# Patient Record
Sex: MALE | Race: WHITE | NOT HISPANIC OR LATINO | URBAN - METROPOLITAN AREA
[De-identification: names, ages, dates, MRNs, and addresses within clinical notes are randomized per-mention and may not be internally consistent; named-entity substitution may affect disease eponyms.]

---

## 2018-01-14 NOTE — RESULT NOTES
Message   PLEASE CALL [facility]   CHOL   - GOOD NUMBER   KIDNEY FUNCTION, LIVER FUNCTION, THYROID CHECK WERE ALL OK   VIT D WAS LOW   RECOMMEND OVER THE COUNTER VIT D3 1000 IU DAILY   THANKS     Verified Results  (1) COMPREHENSIVE METABOLIC PANEL [id number] 72:16RE [facility]     Test Name Result Flag Reference   GLUCOSE 109 mg/dL H 65-99   Fasting reference interval   UREA NITROGEN (BUN) 23 mg/dL  7-25   CREATININE 1 13 mg/dL  0 60-1 35   eGFR NON-AFR  AMERICAN 81 mL/min/1 73m2  > OR = 60   eGFR AFRICAN AMERICAN 94 mL/min/1 73m2  > OR = 60   BUN/CREATININE RATIO   4-16   NOT APPLICABLE (calc)   SODIUM 140 mmol/L  135-146   POTASSIUM 3 8 mmol/L  3 5-5 3   CHLORIDE 104 mmol/L     CARBON DIOXIDE 23 mmol/L  19-30   CALCIUM 9 5 mg/dL  8 6-10 3   PROTEIN, TOTAL 6 9 g/dL  6 1-8 1   ALBUMIN 4 4 g/dL  3 6-5 1   GLOBULIN 2 5 g/dL (calc)  1 9-3 7   ALBUMIN/GLOBULIN RATIO 1 8 (calc)  1 0-2 5   BILIRUBIN, TOTAL 0 7 mg/dL  0 2-1 2   ALKALINE PHOSPHATASE 78 U/L     AST 20 U/L  10-40   ALT 38 U/L  9-46     (1) LIPID PANEL, FASTING 28Apr2016 12:00AM [facility]     Test Name Result Flag Reference   CHOLESTEROL, TOTAL 172 mg/dL  125-200   HDL CHOLESTEROL 46 mg/dL  > OR = 40   TRIGLICERIDES 455 mg/dL  <150   LDL-CHOLESTEROL 103 mg/dL (calc)  <130   Desirable range <100 mg/dL for patients with CHD or  diabetes and <70 mg/dL for diabetic patients with  known heart disease  CHOL/HDLC RATIO 3 7 (calc)  < OR = 5 0   NON HDL CHOLESTEROL 126 mg/dL (calc)     Target for non-HDL cholesterol is 30 mg/dL higher than   LDL cholesterol target       (Q) CBC (H/H, RBC, INDICES, WBC, PLT) 28Apr2016 12:00AM [facility]     Test Name Result Flag Reference   WHITE BLOOD CELL COUNT 9 5 Thousand/uL  3 8-10 8   RED BLOOD CELL COUNT 5 48 Million/uL  4 20-5 80   HEMOGLOBIN 15 5 g/dL  13 2-17 1   HEMATOCRIT 47 7 %  38 5-50 0   MCV 87 1 fL  80 0-100 0   MCH 28 2 pg  27 0-33 0   MCHC 32 4 g/dL  32 0-36 0   RDW 13 5 % 11 0-15 0   PLATELET COUNT 953 Thousand/uL  140-400   MPV 8 5 fL  7 5-11 5   WE RECEIVED YOUR HANDWRITTEN TEST ORDER AND  PERFORMED A HEMOGRAM WITH A PLATELET WITHOUT  A DIFFERENTIAL  IF THIS IS NOT WHAT YOU INTENDED  TO ORDER, PLEASE CONTACT YOUR LOCAL CLIENT SERVICE  REPRESENTATIVE IMMEDIATELY SO THAT WE CAN   ADJUST OUR BILLING APPROPRIATELY  YOU MAY ALSO   INQUIRE ABOUT ALTERNATIVE OR ADDITIONAL TESTING            (Q) TSH, 3RD GENERATION 28Apr2016 12:00AM [facility]     Test Name Result Flag Reference   TSH 1 20 mIU/L  0 40-4 50     *(Q) VITAMIN D, 25-HYDROXY, LC/MS/MS 28Apr2016 12:00AM [facility]     Test Name Result Flag Reference   VITAMIN D, 25-OH, TOTAL 11 ng/mL L    Vitamin D Status         25-OH Vitamin D:     Deficiency:                    <20 ng/mL  Insufficiency:             20 - 29 ng/mL  Optimal:                 > or = 30 ng/mL     For 25-OH Vitamin D testing on patients on   D2-supplementation and patients for whom quantitation   of D2 and D3 fractions is required, the QuestAssureD(TM)  25-OH VIT D, (D2,D3), LC/MS/MS is recommended: order   code 02930 (patients >2yrs)  For more information on this test, go to:  http://education  [employer]/faq/XTL861  (This link is being provided for   informational/educational purposes only )

## 2018-01-16 NOTE — PROGRESS NOTES
Assessment    1  Encounter for preventive health examination (V70 0) (Z00 00)   2  Allergic rhinitis due to pollen (477 0) (J30 1)   3  Midline low back pain without sciatica, unspecified chronicity (724 2) (M54 5)   4  Need for Tdap vaccination (V06 1) (Z23)    Plan  Health Maintenance    · (1) CBC/ PLT (NO DIFF); Status:Active; Requested for:28Apr2016;    · (1) COMPREHENSIVE METABOLIC PANEL; Status:Active; Requested for:28Apr2016;    · (1) LIPID PANEL, FASTING; Status:Active; Requested for:28Apr2016;    · (1) TSH; Status:Active; Requested for:28Apr2016;    · (1) VITAMIN D 25-HYDROXY; Status:Active; Requested for:28Apr2016;    · EKG/ECG- POC; Status:Active; Requested for:28Apr2016;    · Hemoccult Screening - POC; Status:Active; Requested for:28Apr2016;    · Routine Venipuncture - POC; Status:Complete;   Done: [id number]   · Urine Dip Automated- POC; Status:Active; Requested for:28Apr2016;    · Follow-up visit in 1 year Evaluation and Treatment  Follow-up  Status: Hold For -  Scheduling  Requested for: 28Apr2016   · Drink plenty of fluids ; Status:Complete;   Done: 28Apr2016   · Eat a low fat and low cholesterol diet ; Status:Complete;   Done: [id number]   · Use a sun block product with an SPF of 15 or more ; Status:Complete;   Done:  [id number]   · We encourage all of our patients to exercise regularly  30 minutes of exercise or physical  activity five or more days a week is recommended for children and adults ;  Status:Complete;   Done: 28Apr2016   · We recommend routine visits to a dentist ; Status:Complete;   Done: [id number]   · We recommend that you bring your body mass index down to 26 ; Status:Complete;    Done: 28Apr2016  Health Maintenance, Encounter for prostate cancer screening    · (1) PSA (SCREEN) (Dx V76 44 Screen for Prostate Cancer); Status:Active; Requested  for:28Apr2016; Health Maintenance, Need for Tdap vaccination    · Tdap; INJECT 0 5  ML Intramuscular;  To Be Done: [id number]    Discussion/Summary  Impression: health maintenance visit  Currently, he eats a healthy diet  Prostate cancer screening: prostate cancer screening is current and PSA was ordered  Testicular cancer screening: testicular cancer screening is current  Colorectal cancer screening: colorectal cancer screening is current and fecal occult blood testing is needed every year  Screening lab work includes glucose, lipid profile, 25-hydroxyvitamin D and urinalysis  The immunizations are needed  Advice and education were given regarding sunscreen use and seat belt use  DISCUSSED HEALTH MAINTENANCE ISSUES  BW WILL BE OBTAINED  TETANUS WILL BE GIVEN  RV 1 YR FOR CPX     Chief Complaint  Patient is here today for a complete physical exam  lb/lpn      History of Present Illness  HM, Adult Male: The patient is being seen for a health maintenance evaluation  General Health: The patient's health since the last visit is described as good  He has regular dental visits  He denies vision problems  He denies hearing loss  Immunizations status: not up to date  Lifestyle:  He consumes a diverse and healthy diet  He does not have any weight concerns  He exercises regularly  He does not use tobacco  He consumes alcohol  He denies drug use  Screening: cancer screening reviewed and current  metabolic screening reviewed and current  risk screening reviewed and current  HPI: [address] [place] [address] Rd RECORD  NO MAJOR CONCERNS      Review of Systems    Constitutional: no fever, no chills and not feeling tired  Eyes: no eyesight problems  ENT: no sore throat and no nasal discharge  Cardiovascular: no chest pain, the heart rate was not fast, no palpitations and no extremity edema  Respiratory: no shortness of breath, no cough, no wheezing and no shortness of breath during exertion  Gastrointestinal: no abdominal pain, no nausea, no vomiting and no diarrhea  Genitourinary: no nocturia  Musculoskeletal: no arthralgias and no joint stiffness  Integumentary: no rashes  Neurological: no headache, no numbness, no tingling and no dizziness  Psychiatric: no anxiety and no depression  Endocrine: no muscle weakness  Hematologic/Lymphatic: no swollen glands in the neck  ROS reviewed  Active Problems    1  Herpes simplex (054 9) (B00 9)    Surgical History    · Denied: History Of Prior Surgery    Family History  Mother    · Family history of osteoporosis (V17 81) (Z80 61)  Father    · Family history of diabetes mellitus (V18 0) (Z83 3)   · Family history of glaucoma (V19 11) (Z83 511)   · Family history of hypertension (V17 49) (Z82 49)  Family History    · Denied: Family history of mental disorder    Social History    · Caffeine use (V49 89) (F15 90)   · Dental care, regularly   · Drinks coffee   · Minimum alcohol consumption   · Never a smoker    Current Meds   1  No Reported Medications Recorded    Allergies    1  No Known Drug Allergies    Vitals   Recorded: 28Apr2016 03:25PM   Temperature 97 7 F, Tympanic    Heart Rate 68, L Radial    Pulse Quality Normal, L Radial    Respiration 16    Respiration Quality Normal    Systolic 048, LUE, Sitting    Diastolic 84, LUE, Sitting    Height 6 ft 0 75 in    Weight 198 lb     BMI Calculated 26 3    BSA Calculated 2 14    Patient Refused Height No No   Patient Refused Weight No No     Physical Exam    Constitutional   General appearance: No acute distress, well appearing and well nourished  Head and Face   Head and face: Normal     Eyes   Conjunctiva and lids: No erythema, swelling or discharge  Pupils and irises: Equal, round, reactive to light  Ophthalmoscopic examination: Normal fundi and optic discs  Ears, Nose, Mouth, and Throat   External inspection of ears and nose: Normal     Otoscopic examination: Tympanic membranes translucent with normal light reflex  Canals patent without erythema      Nasal mucosa, septum, and turbinates: Normal without edema or erythema  Lips, teeth, and gums: Normal, good dentition  Oropharynx: Normal with no erythema, edema, exudate or lesions  Neck   Neck: Supple, symmetric, trachea midline, no masses  Thyroid: Normal, no thyromegaly  Pulmonary   Respiratory effort: No increased work of breathing or signs of respiratory distress  Auscultation of lungs: Clear to auscultation  Cardiovascular   Auscultation of heart: Normal rate and rhythm, normal S1 and S2, no murmurs  Carotid pulses: 2+ bilaterally  Abdominal aorta: Normal     Femoral pulses: 2+ bilaterally  Pedal pulses: 2+ bilaterally  Peripheral vascular exam: Normal     Examination of extremities for edema and/or varicosities: Normal     Abdomen   Abdomen: Non-tender, no masses  Liver and spleen: No hepatomegaly or splenomegaly  Examination for hernias: No hernias appreciated  Anus, perineum, and rectum: Normal sphincter tone, no masses, no prolapse  Stool sample for occult blood: Negative  STOOL HEME NEG  Genitourinary   Scrotal contents: Normal testes, no masses  Penis: Normal, no lesions  Digital rectal exam of prostate: Normal size, no masses  Lymphatic   Palpation of lymph nodes in neck: No lymphadenopathy  Palpation of lymph nodes in axillae: No lymphadenopathy  Palpation of lymph nodes in groin: No lymphadenopathy  Musculoskeletal   Gait and station: Normal     Inspection/palpation of digits and nails: Normal without clubbing or cyanosis  Inspection/palpation of joints, bones, and muscles: Normal     Range of motion: Normal     Stability: Normal     Muscle strength/tone: Normal     Skin   Skin and subcutaneous tissue: Normal without rashes or lesions  Neurologic   Cranial nerves: Cranial nerves 2-12 intact  Cortical function: Normal mental status  Reflexes: 2+ and symmetric  Sensation: No sensory loss  Coordination: Normal finger to nose and heel to shin      Psychiatric Judgment and insight: Normal     Orientation to person, place and time: Normal     Mood and affect: Normal        Results/Data  Falls Risk Assessment (Dx V80 09 Screen for Neurologic Disorder) 28Apr2016 03:25PM User, Ahs     Test Name Result Flag Reference   Falls Risk      No falls in the past year     PHQ-2 Adult Depression Screening 28Apr2016 03:25PM User, s     Test Name Result Flag Reference   PHQ-2 Adult Depression Score 0     Q1: 0, Q2: 0   PHQ-2 Adult Depression Screening Negative         Signatures   Electronically signed by : Richard Ordaz MD; Apr 28 2016  4:04PM EST                       (Author)

## 2021-04-19 ENCOUNTER — TELEPHONE (OUTPATIENT)
Dept: FAMILY MEDICINE CLINIC | Facility: CLINIC | Age: 46
End: 2021-04-19

## 2021-04-19 NOTE — TELEPHONE ENCOUNTER
LMOM for pt to call back   Need to verify pcp, pt has not bee seen here since 2016  [facility] kilo

## 2021-04-20 NOTE — TELEPHONE ENCOUNTER
04/20/21 8:13 AM     Thank you for your request  Your request has been received, reviewed, and the patient chart updated  The PCP has successfully been removed with a patient attribution note  This message will now be completed      Thank you  Pili Morgan